# Patient Record
Sex: MALE | Race: BLACK OR AFRICAN AMERICAN | NOT HISPANIC OR LATINO | Employment: FULL TIME | URBAN - METROPOLITAN AREA
[De-identification: names, ages, dates, MRNs, and addresses within clinical notes are randomized per-mention and may not be internally consistent; named-entity substitution may affect disease eponyms.]

---

## 2024-04-17 ENCOUNTER — HOSPITAL ENCOUNTER (EMERGENCY)
Facility: MEDICAL CENTER | Age: 29
End: 2024-04-17
Attending: EMERGENCY MEDICINE

## 2024-04-17 VITALS
HEART RATE: 74 BPM | WEIGHT: 147.71 LBS | HEIGHT: 73 IN | RESPIRATION RATE: 16 BRPM | DIASTOLIC BLOOD PRESSURE: 71 MMHG | SYSTOLIC BLOOD PRESSURE: 124 MMHG | OXYGEN SATURATION: 100 % | BODY MASS INDEX: 19.58 KG/M2 | TEMPERATURE: 98.1 F

## 2024-04-17 DIAGNOSIS — K08.89 DENTALGIA: ICD-10-CM

## 2024-04-17 PROCEDURE — A9270 NON-COVERED ITEM OR SERVICE: HCPCS | Performed by: EMERGENCY MEDICINE

## 2024-04-17 PROCEDURE — 99283 EMERGENCY DEPT VISIT LOW MDM: CPT

## 2024-04-17 PROCEDURE — 700102 HCHG RX REV CODE 250 W/ 637 OVERRIDE(OP): Performed by: EMERGENCY MEDICINE

## 2024-04-17 RX ORDER — IBUPROFEN 600 MG/1
600 TABLET ORAL EVERY 6 HOURS PRN
Qty: 30 TABLET | Refills: 0 | Status: SHIPPED | OUTPATIENT
Start: 2024-04-17 | End: 2024-04-17

## 2024-04-17 RX ORDER — IBUPROFEN 600 MG/1
600 TABLET ORAL EVERY 6 HOURS PRN
Qty: 30 TABLET | Refills: 0 | Status: SHIPPED | OUTPATIENT
Start: 2024-04-17

## 2024-04-17 RX ORDER — CLINDAMYCIN HYDROCHLORIDE 300 MG/1
300 CAPSULE ORAL 3 TIMES DAILY
Qty: 30 CAPSULE | Refills: 0 | Status: ACTIVE | OUTPATIENT
Start: 2024-04-17 | End: 2024-04-27

## 2024-04-17 RX ORDER — CLINDAMYCIN HYDROCHLORIDE 300 MG/1
300 CAPSULE ORAL 3 TIMES DAILY
Qty: 30 CAPSULE | Refills: 0 | Status: SHIPPED | OUTPATIENT
Start: 2024-04-17 | End: 2024-04-17

## 2024-04-17 RX ORDER — IBUPROFEN 600 MG/1
600 TABLET ORAL ONCE
Status: COMPLETED | OUTPATIENT
Start: 2024-04-17 | End: 2024-04-17

## 2024-04-17 RX ORDER — OXYCODONE HYDROCHLORIDE AND ACETAMINOPHEN 5; 325 MG/1; MG/1
1 TABLET ORAL ONCE
Status: COMPLETED | OUTPATIENT
Start: 2024-04-17 | End: 2024-04-17

## 2024-04-17 RX ADMIN — OXYCODONE AND ACETAMINOPHEN 1 TABLET: 5; 325 TABLET ORAL at 12:57

## 2024-04-17 RX ADMIN — IBUPROFEN 600 MG: 600 TABLET, FILM COATED ORAL at 12:57

## 2024-04-17 ASSESSMENT — LIFESTYLE VARIABLES
TOTAL SCORE: 0
DO YOU DRINK ALCOHOL: NO
HAVE PEOPLE ANNOYED YOU BY CRITICIZING YOUR DRINKING: NO
EVER FELT BAD OR GUILTY ABOUT YOUR DRINKING: NO
EVER HAD A DRINK FIRST THING IN THE MORNING TO STEADY YOUR NERVES TO GET RID OF A HANGOVER: NO
CONSUMPTION TOTAL: INCOMPLETE
HAVE YOU EVER FELT YOU SHOULD CUT DOWN ON YOUR DRINKING: NO

## 2024-04-17 ASSESSMENT — COGNITIVE AND FUNCTIONAL STATUS - GENERAL
SUGGESTED CMS G CODE MODIFIER DAILY ACTIVITY: CH
DAILY ACTIVITIY SCORE: 24
MOBILITY SCORE: 24
SUGGESTED CMS G CODE MODIFIER MOBILITY: CH

## 2024-04-17 ASSESSMENT — PAIN DESCRIPTION - PAIN TYPE: TYPE: ACUTE PAIN

## 2024-04-17 NOTE — ED NOTES
Discharge teaching and paperwork provided regarding dental pain and all questions/concerns answered. VSS,  assessment stable. Given information regarding home care and reasons to follow up with ED or primary MD. Patient provided ibuprofen Rx. Patient discharged to the care of spouse and ambulated out of the ED.

## 2024-04-17 NOTE — ED PROVIDER NOTES
"ED Provider Note    CHIEF COMPLAINT  Chief Complaint   Patient presents with    Dental Pain     Patient reports R upper dental pain and swelling for the last 5 days. Patient reports he had a crack in that tooth and believes it is now infected     HPI/ROS    Debi Cortez is a 28 y.o. male who presents with dental pain.  The patient has pain to the right upper molar.  He is aware of decay and a fracture in this region but feels there is an acute infection as he has been having increasing pain in the last 5 days.  Does not have any facial swelling.  He does not have any associated fevers.  He has not had any nausea or vomiting.    PAST MEDICAL HISTORY       SURGICAL HISTORY  patient denies any surgical history    FAMILY HISTORY  History reviewed. No pertinent family history.    SOCIAL HISTORY  Social History     Tobacco Use    Smoking status: Never    Smokeless tobacco: Never   Substance and Sexual Activity    Alcohol use: Never    Drug use: Yes    Sexual activity: Not on file       CURRENT MEDICATIONS  Home Medications       Reviewed by Juli Douglas R.N. (Registered Nurse) on 04/17/24 at 1211  Med List Status: Partial     Medication Last Dose Status        Patient Freddie Taking any Medications                           ALLERGIES  No Known Allergies    PHYSICAL EXAM  VITAL SIGNS: /69   Pulse 67   Temp 36.6 °C (97.9 °F) (Oral)   Resp 16   Ht 1.854 m (6' 1\")   Wt 67 kg (147 lb 11.3 oz)   SpO2 97%   BMI 19.49 kg/m²    In general the patient appears uncomfortable but nontoxic    Facial examination the patient does not have any facial swelling    Oral cavity the patient does have poor dentition with significant decay and fracture of the right upper molar with pain with percussion.  There is no gingival fluctuance    Neck is supple without adenopathy      COURSE & MEDICAL DECISION MAKING    This is a 28-year-old male who presents to the emergency department with dentalgia.  I suspect he could be developing " infection based on the acute onset of his pain.  He will require resection of the molar that is fractured and decayed.  The patient be placed on clindamycin for antibiotic coverage.  He will receive a one-time dose of Percocet as well as Motrin prior to discharge.  I will prescribe Motrin from an anti-inflammatory standpoint.  The patient will return to the emergency department for facial swelling.  Otherwise I give the patient referrals for outpatient management from a dental standpoint.    FINAL DIAGNOSIS  Dentalgia    Disposition  The patient will be discharged in stable condition       Electronically signed by: Jhony Mckeon M.D., 4/17/2024 12:43 PM

## 2024-04-17 NOTE — ED TRIAGE NOTES
"Chief Complaint   Patient presents with    Dental Pain     Patient reports R upper dental pain and swelling for the last 5 days. Patient reports he had a crack in that tooth and believes it is now infected       29 yo male to triage for above complaint. Patient is not established with dentist    Pt is alert and oriented, speaking in full sentences, follows commands and responds appropriately to questions.     Patient placed back in lobby and educated on triage process. Asked to inform RN of any changes.    /69   Pulse 67   Temp 36.6 °C (97.9 °F) (Oral)   Resp 16   Ht 1.854 m (6' 1\")   Wt 67 kg (147 lb 11.3 oz)   SpO2 97%   BMI 19.49 kg/m²     "

## 2024-06-04 ENCOUNTER — HOSPITAL ENCOUNTER (EMERGENCY)
Facility: MEDICAL CENTER | Age: 29
End: 2024-06-04
Attending: EMERGENCY MEDICINE

## 2024-06-04 VITALS
DIASTOLIC BLOOD PRESSURE: 64 MMHG | WEIGHT: 154.32 LBS | RESPIRATION RATE: 15 BRPM | BODY MASS INDEX: 20.36 KG/M2 | OXYGEN SATURATION: 94 % | SYSTOLIC BLOOD PRESSURE: 110 MMHG | TEMPERATURE: 97.3 F | HEART RATE: 72 BPM

## 2024-06-04 DIAGNOSIS — R19.7 DIARRHEA OF PRESUMED INFECTIOUS ORIGIN: ICD-10-CM

## 2024-06-04 DIAGNOSIS — R11.2 NAUSEA AND VOMITING, UNSPECIFIED VOMITING TYPE: ICD-10-CM

## 2024-06-04 LAB
ALBUMIN SERPL BCP-MCNC: 4.4 G/DL (ref 3.2–4.9)
ALBUMIN/GLOB SERPL: 1.6 G/DL
ALP SERPL-CCNC: 65 U/L (ref 30–99)
ALT SERPL-CCNC: 14 U/L (ref 2–50)
ANION GAP SERPL CALC-SCNC: 12 MMOL/L (ref 7–16)
APPEARANCE UR: CLEAR
AST SERPL-CCNC: 19 U/L (ref 12–45)
BASOPHILS # BLD AUTO: 0.3 % (ref 0–1.8)
BASOPHILS # BLD: 0.02 K/UL (ref 0–0.12)
BILIRUB SERPL-MCNC: 0.4 MG/DL (ref 0.1–1.5)
BILIRUB UR QL STRIP.AUTO: NEGATIVE
BUN SERPL-MCNC: 12 MG/DL (ref 8–22)
CALCIUM ALBUM COR SERPL-MCNC: 9 MG/DL (ref 8.5–10.5)
CALCIUM SERPL-MCNC: 9.3 MG/DL (ref 8.5–10.5)
CHLORIDE SERPL-SCNC: 104 MMOL/L (ref 96–112)
CO2 SERPL-SCNC: 23 MMOL/L (ref 20–33)
COLOR UR: YELLOW
CREAT SERPL-MCNC: 0.85 MG/DL (ref 0.5–1.4)
EOSINOPHIL # BLD AUTO: 0.12 K/UL (ref 0–0.51)
EOSINOPHIL NFR BLD: 1.5 % (ref 0–6.9)
ERYTHROCYTE [DISTWIDTH] IN BLOOD BY AUTOMATED COUNT: 33.2 FL (ref 35.9–50)
GFR SERPLBLD CREATININE-BSD FMLA CKD-EPI: 119 ML/MIN/1.73 M 2
GLOBULIN SER CALC-MCNC: 2.8 G/DL (ref 1.9–3.5)
GLUCOSE SERPL-MCNC: 87 MG/DL (ref 65–99)
GLUCOSE UR STRIP.AUTO-MCNC: NEGATIVE MG/DL
HCT VFR BLD AUTO: 42.6 % (ref 42–52)
HGB BLD-MCNC: 14 G/DL (ref 14–18)
IMM GRANULOCYTES # BLD AUTO: 0.02 K/UL (ref 0–0.11)
IMM GRANULOCYTES NFR BLD AUTO: 0.3 % (ref 0–0.9)
KETONES UR STRIP.AUTO-MCNC: NEGATIVE MG/DL
LEUKOCYTE ESTERASE UR QL STRIP.AUTO: NEGATIVE
LIPASE SERPL-CCNC: 24 U/L (ref 11–82)
LYMPHOCYTES # BLD AUTO: 1.63 K/UL (ref 1–4.8)
LYMPHOCYTES NFR BLD: 20.5 % (ref 22–41)
MCH RBC QN AUTO: 23.4 PG (ref 27–33)
MCHC RBC AUTO-ENTMCNC: 32.9 G/DL (ref 32.3–36.5)
MCV RBC AUTO: 71.2 FL (ref 81.4–97.8)
MICRO URNS: NORMAL
MONOCYTES # BLD AUTO: 0.58 K/UL (ref 0–0.85)
MONOCYTES NFR BLD AUTO: 7.3 % (ref 0–13.4)
NEUTROPHILS # BLD AUTO: 5.6 K/UL (ref 1.82–7.42)
NEUTROPHILS NFR BLD: 70.1 % (ref 44–72)
NITRITE UR QL STRIP.AUTO: NEGATIVE
NRBC # BLD AUTO: 0 K/UL
NRBC BLD-RTO: 0 /100 WBC (ref 0–0.2)
PH UR STRIP.AUTO: 6 [PH] (ref 5–8)
PLATELET # BLD AUTO: 225 K/UL (ref 164–446)
PMV BLD AUTO: 11.2 FL (ref 9–12.9)
POTASSIUM SERPL-SCNC: 3.8 MMOL/L (ref 3.6–5.5)
PROT SERPL-MCNC: 7.2 G/DL (ref 6–8.2)
PROT UR QL STRIP: NEGATIVE MG/DL
RBC # BLD AUTO: 5.98 M/UL (ref 4.7–6.1)
RBC UR QL AUTO: NEGATIVE
SODIUM SERPL-SCNC: 139 MMOL/L (ref 135–145)
SP GR UR STRIP.AUTO: 1.02
UROBILINOGEN UR STRIP.AUTO-MCNC: 1 MG/DL
WBC # BLD AUTO: 8 K/UL (ref 4.8–10.8)

## 2024-06-04 PROCEDURE — 700111 HCHG RX REV CODE 636 W/ 250 OVERRIDE (IP): Performed by: EMERGENCY MEDICINE

## 2024-06-04 PROCEDURE — 36415 COLL VENOUS BLD VENIPUNCTURE: CPT

## 2024-06-04 PROCEDURE — A9270 NON-COVERED ITEM OR SERVICE: HCPCS | Performed by: EMERGENCY MEDICINE

## 2024-06-04 PROCEDURE — 85025 COMPLETE CBC W/AUTO DIFF WBC: CPT

## 2024-06-04 PROCEDURE — 700102 HCHG RX REV CODE 250 W/ 637 OVERRIDE(OP): Performed by: EMERGENCY MEDICINE

## 2024-06-04 PROCEDURE — 80053 COMPREHEN METABOLIC PANEL: CPT

## 2024-06-04 PROCEDURE — 81003 URINALYSIS AUTO W/O SCOPE: CPT

## 2024-06-04 PROCEDURE — 83690 ASSAY OF LIPASE: CPT

## 2024-06-04 PROCEDURE — 99284 EMERGENCY DEPT VISIT MOD MDM: CPT

## 2024-06-04 RX ORDER — DICYCLOMINE HCL 20 MG
20 TABLET ORAL EVERY 6 HOURS
Qty: 40 TABLET | Refills: 0 | Status: SHIPPED | OUTPATIENT
Start: 2024-06-04 | End: 2024-06-14

## 2024-06-04 RX ORDER — ONDANSETRON 4 MG/1
4 TABLET, ORALLY DISINTEGRATING ORAL ONCE
Status: COMPLETED | OUTPATIENT
Start: 2024-06-04 | End: 2024-06-04

## 2024-06-04 RX ORDER — ONDANSETRON 4 MG/1
4 TABLET, ORALLY DISINTEGRATING ORAL EVERY 6 HOURS PRN
Qty: 10 TABLET | Refills: 0 | Status: SHIPPED | OUTPATIENT
Start: 2024-06-04

## 2024-06-04 RX ORDER — DICYCLOMINE HCL 20 MG
20 TABLET ORAL ONCE
Status: COMPLETED | OUTPATIENT
Start: 2024-06-04 | End: 2024-06-04

## 2024-06-04 RX ADMIN — DICYCLOMINE HYDROCHLORIDE 20 MG: 20 TABLET ORAL at 16:16

## 2024-06-04 RX ADMIN — ONDANSETRON 4 MG: 4 TABLET, ORALLY DISINTEGRATING ORAL at 15:48

## 2024-06-04 NOTE — ED PROVIDER NOTES
ED Provider Note    CHIEF COMPLAINT  Chief Complaint   Patient presents with    N/V    Diarrhea       EXTERNAL RECORDS REVIEWED  Her ER visit from 4/17/2024 for dental pain.    HPI/ROS  LIMITATION TO HISTORY   Select: : None  OUTSIDE HISTORIAN(S):  none    Debi Cortez is a 28 y.o. male who presents the emergency room for concerns regarding some diarrhea that started yesterday evening that after several hours progressed to multiple episodes of nonbilious nonbloody emesis.  He had some abdominal cramping with this, has not any bloody bowel movements and says that he is unsure of any sick exposures in the household but says he does have some possible sick exposures at work and is unsure of any foodborne illness that he may have acquired.  He has not had any fevers, no prior history of abdominal surgeries, he says he is feeling improved at the moment but says some of these episodes have been episodic in nature.    PAST MEDICAL HISTORY   Cleft lip repair    SURGICAL HISTORY  patient denies any surgical history    FAMILY HISTORY  No family history on file.    SOCIAL HISTORY  Social History     Tobacco Use    Smoking status: Never    Smokeless tobacco: Never   Substance and Sexual Activity    Alcohol use: Never    Drug use: Yes    Sexual activity: Not on file       CURRENT MEDICATIONS  Home Medications       Reviewed by Amie Tan R.N. (Registered Nurse) on 06/04/24 at 1417  Med List Status: Partial     Medication Last Dose Status   ibuprofen (MOTRIN) 600 MG Tab  Active                    ALLERGIES  No Known Allergies    PHYSICAL EXAM  VITAL SIGNS: BP 91/58   Pulse 70   Temp 36.2 °C (97.1 °F) (Temporal)   Resp 16   Wt 70 kg (154 lb 5.2 oz)   SpO2 93%   BMI 20.36 kg/m²    Genl: M sitting in chair comfortably, speaking clearly, appears in no acute distress   Head: NC/AT   ENT: Mucous membranes moist, posterior pharynx clear, uvula midline, nares patent bilaterally   Eyes: Normal sclera, pupils equal round  reactive to light  Pulmonary: Lungs are clear to auscultation bilaterally  Chest: No TTP  CV:  RRR, no murmur appreciated, pulses 2+ in both upper and lower extremities,  Abdomen: soft, NT/ND; no rebound/guarding, no masses palpated  Musculoskeletal: Pain free ROM of the neck. Moving upper and lower extremities in spontaneous and coordinated fashion  Neuro: A&Ox4 (person, place, time, situation), speech fluent, gait steady, no focal deficits appreciated, No cerebellar signs. Sensation is grossly intact in the distal upper and lower extremities.  5/5 strength in  and dorsiflexion/plantar flexion of the ankles  Psych: Patient has an appropriate affect and behavior  Skin: No rash or lesions.  No pallor or jaundice.  No cyanosis.  Warm and dry.     EKG/LABS  Labs Reviewed   CBC WITH DIFFERENTIAL - Abnormal; Notable for the following components:       Result Value    MCV 71.2 (*)     MCH 23.4 (*)     RDW 33.2 (*)     Lymphocytes 20.50 (*)     All other components within normal limits   COMP METABOLIC PANEL   LIPASE   URINALYSIS   ESTIMATED GFR     RADIOLOGY/PROCEDURES   none    COURSE & MEDICAL DECISION MAKING    ASSESSMENT, COURSE AND PLAN  Care Narrative: Presents emergency room for symptoms as described above.  The patient is alert, oriented, has been having acute onset of some diarrhea without passage of blood, some nauseousness and vomiting with no localizing abdominal pain, no abdominal distention.  This is most likely reflective of foodborne illness versus bacterial toxin ingestion or viral gastroenteritis.  Overall the patient remains nontoxic and lab work had been obtained in triage.  He has no leukocytosis, no concerning anemia, no gross electrolyte derangements or FARZANA and no LFT changes or evidence of pancreatitis.  He tolerated oral medications well, was able to tolerate p.o. intake at this time I do believe that symptomatic control would be recommended and no further advanced medical imaging or IV fluid  resuscitation is currently indicated.  Patient is amenable to this, will follow-up with her outpatient physician at the [facility].  Discharged home in stable condition.    DISPOSITION AND DISCUSSIONS  I have discussed management of the patient with the following physicians and OTTO's:  none    Discussion of management with other \[facility]\"" or appropriate source(s): None     Escalation of care considered, and ultimately not performed:IV fluids and diagnostic imaging    Barriers to care at this time, including but not limited to: none.     Decision tools and prescription drugs considered including, but not limited to:  Zofran and Bentyl .    FINAL DIAGNOSIS  1. Nausea and vomiting, unspecified vomiting type    2. Diarrhea of presumed infectious origin      Electronically signed by: Austyn Hull M.D., 6/4/2024 3:37 PM

## 2024-06-04 NOTE — ED NOTES
Pt discharged to lobby with steady gait. GCS 15. Pt in possession of belongings. Pt provided discharge education and information pertaining to medications and follow up appointments. Pt received copy of discharge instructions and verbalized understanding.     Vitals:    06/04/24 1617   BP: 110/64   Pulse: 72   Resp: 15   Temp: 36.3 °C (97.3 °F)   SpO2: 94%

## 2024-12-26 ENCOUNTER — HOSPITAL ENCOUNTER (EMERGENCY)
Facility: MEDICAL CENTER | Age: 29
End: 2024-12-26
Attending: EMERGENCY MEDICINE
Payer: COMMERCIAL

## 2024-12-26 ENCOUNTER — APPOINTMENT (OUTPATIENT)
Dept: RADIOLOGY | Facility: MEDICAL CENTER | Age: 29
End: 2024-12-26
Attending: EMERGENCY MEDICINE
Payer: COMMERCIAL

## 2024-12-26 ENCOUNTER — PHARMACY VISIT (OUTPATIENT)
Dept: PHARMACY | Facility: MEDICAL CENTER | Age: 29
End: 2024-12-26
Payer: MEDICARE

## 2024-12-26 VITALS
RESPIRATION RATE: 16 BRPM | OXYGEN SATURATION: 100 % | TEMPERATURE: 97 F | HEIGHT: 73 IN | DIASTOLIC BLOOD PRESSURE: 75 MMHG | SYSTOLIC BLOOD PRESSURE: 122 MMHG | HEART RATE: 60 BPM | WEIGHT: 155 LBS | BODY MASS INDEX: 20.54 KG/M2

## 2024-12-26 DIAGNOSIS — R07.9 CHEST PAIN, UNSPECIFIED TYPE: ICD-10-CM

## 2024-12-26 DIAGNOSIS — K29.00 ACUTE GASTRITIS WITHOUT HEMORRHAGE, UNSPECIFIED GASTRITIS TYPE: ICD-10-CM

## 2024-12-26 LAB
ALBUMIN SERPL BCP-MCNC: 4.5 G/DL (ref 3.2–4.9)
ALBUMIN/GLOB SERPL: 1.5 G/DL
ALP SERPL-CCNC: 76 U/L (ref 30–99)
ALT SERPL-CCNC: 30 U/L (ref 2–50)
ANION GAP SERPL CALC-SCNC: 12 MMOL/L (ref 7–16)
AST SERPL-CCNC: 36 U/L (ref 12–45)
BASOPHILS # BLD AUTO: 0.9 % (ref 0–1.8)
BASOPHILS # BLD: 0.05 K/UL (ref 0–0.12)
BILIRUB SERPL-MCNC: 0.5 MG/DL (ref 0.1–1.5)
BUN SERPL-MCNC: 15 MG/DL (ref 8–22)
CALCIUM ALBUM COR SERPL-MCNC: 9.1 MG/DL (ref 8.5–10.5)
CALCIUM SERPL-MCNC: 9.5 MG/DL (ref 8.5–10.5)
CHLORIDE SERPL-SCNC: 107 MMOL/L (ref 96–112)
CO2 SERPL-SCNC: 21 MMOL/L (ref 20–33)
CREAT SERPL-MCNC: 0.92 MG/DL (ref 0.5–1.4)
EKG IMPRESSION: NORMAL
EOSINOPHIL # BLD AUTO: 0.28 K/UL (ref 0–0.51)
EOSINOPHIL NFR BLD: 4.9 % (ref 0–6.9)
ERYTHROCYTE [DISTWIDTH] IN BLOOD BY AUTOMATED COUNT: 35.3 FL (ref 35.9–50)
GFR SERPLBLD CREATININE-BSD FMLA CKD-EPI: 114 ML/MIN/1.73 M 2
GLOBULIN SER CALC-MCNC: 3.1 G/DL (ref 1.9–3.5)
GLUCOSE SERPL-MCNC: 78 MG/DL (ref 65–99)
HCT VFR BLD AUTO: 47.1 % (ref 42–52)
HGB BLD-MCNC: 15.2 G/DL (ref 14–18)
IMM GRANULOCYTES # BLD AUTO: 0.01 K/UL (ref 0–0.11)
IMM GRANULOCYTES NFR BLD AUTO: 0.2 % (ref 0–0.9)
LYMPHOCYTES # BLD AUTO: 2.14 K/UL (ref 1–4.8)
LYMPHOCYTES NFR BLD: 37.3 % (ref 22–41)
MCH RBC QN AUTO: 23.6 PG (ref 27–33)
MCHC RBC AUTO-ENTMCNC: 32.3 G/DL (ref 32.3–36.5)
MCV RBC AUTO: 73.3 FL (ref 81.4–97.8)
MONOCYTES # BLD AUTO: 0.54 K/UL (ref 0–0.85)
MONOCYTES NFR BLD AUTO: 9.4 % (ref 0–13.4)
NEUTROPHILS # BLD AUTO: 2.71 K/UL (ref 1.82–7.42)
NEUTROPHILS NFR BLD: 47.3 % (ref 44–72)
NRBC # BLD AUTO: 0 K/UL
NRBC BLD-RTO: 0 /100 WBC (ref 0–0.2)
PLATELET # BLD AUTO: 190 K/UL (ref 164–446)
PMV BLD AUTO: 11.1 FL (ref 9–12.9)
POTASSIUM SERPL-SCNC: 4.1 MMOL/L (ref 3.6–5.5)
PROT SERPL-MCNC: 7.6 G/DL (ref 6–8.2)
RBC # BLD AUTO: 6.43 M/UL (ref 4.7–6.1)
SODIUM SERPL-SCNC: 140 MMOL/L (ref 135–145)
TROPONIN T SERPL-MCNC: 6 NG/L (ref 6–19)
TROPONIN T SERPL-MCNC: <6 NG/L (ref 6–19)
WBC # BLD AUTO: 5.7 K/UL (ref 4.8–10.8)

## 2024-12-26 PROCEDURE — 80053 COMPREHEN METABOLIC PANEL: CPT

## 2024-12-26 PROCEDURE — 99284 EMERGENCY DEPT VISIT MOD MDM: CPT

## 2024-12-26 PROCEDURE — 36415 COLL VENOUS BLD VENIPUNCTURE: CPT

## 2024-12-26 PROCEDURE — 700102 HCHG RX REV CODE 250 W/ 637 OVERRIDE(OP): Performed by: EMERGENCY MEDICINE

## 2024-12-26 PROCEDURE — 71045 X-RAY EXAM CHEST 1 VIEW: CPT

## 2024-12-26 PROCEDURE — 93005 ELECTROCARDIOGRAM TRACING: CPT | Mod: TC | Performed by: EMERGENCY MEDICINE

## 2024-12-26 PROCEDURE — 93005 ELECTROCARDIOGRAM TRACING: CPT | Mod: TC

## 2024-12-26 PROCEDURE — 84484 ASSAY OF TROPONIN QUANT: CPT

## 2024-12-26 PROCEDURE — A9270 NON-COVERED ITEM OR SERVICE: HCPCS | Performed by: EMERGENCY MEDICINE

## 2024-12-26 PROCEDURE — RXMED WILLOW AMBULATORY MEDICATION CHARGE: Performed by: EMERGENCY MEDICINE

## 2024-12-26 PROCEDURE — 85025 COMPLETE CBC W/AUTO DIFF WBC: CPT

## 2024-12-26 RX ORDER — FAMOTIDINE 20 MG/1
20 TABLET, FILM COATED ORAL 2 TIMES DAILY
Qty: 28 TABLET | Refills: 0 | Status: SHIPPED | OUTPATIENT
Start: 2024-12-26 | End: 2025-01-09

## 2024-12-26 RX ADMIN — LIDOCAINE HYDROCHLORIDE 30 ML: 20 SOLUTION ORAL; TOPICAL at 08:10

## 2024-12-26 ASSESSMENT — FIBROSIS 4 INDEX: FIB4 SCORE: 0.7

## 2024-12-26 ASSESSMENT — PAIN DESCRIPTION - DESCRIPTORS: DESCRIPTORS: SHOOTING;CRAMPING

## 2024-12-26 ASSESSMENT — PAIN DESCRIPTION - PAIN TYPE: TYPE: ACUTE PAIN

## 2024-12-26 NOTE — DISCHARGE INSTRUCTIONS
Your workup today was very reassuring.  Your symptoms are most consistent with likely inflammation of your stomach.  Your heart workup was very normal.  EKG was reassuring, your heart enzyme was normal, the rest your labs are all very reassuring.  Take the medication as directed.  If your symptoms worsen significantly return to the Emergency Department

## 2024-12-26 NOTE — ED PROVIDER NOTES
ED Provider Note    CHIEF COMPLAINT  Chief Complaint   Patient presents with    Chest Pain     C/o mid sternal chest pain since 12 mn. Described as shock and cramping pain. Constant in nature. Denies SOB. + Dizziness         HPI/ROS      Debi Cortez is a 29 y.o. male who presents with chief complaint of chest pain for the last 7 hours.  Chest pain began around midnight.  Patient reports pain is in his lower chest, upper abdomen.  He drank some alcohol and ate a relatively heavy dinner last night for [place].  Pain has no association with exertion. Reports they can walk without any difficulty or symptom provocation   Pain with no associated SOB  Denies associated nausea or diaphoresis  denies decreased exertional capacity  denies associated lower extremity edema, orthopnea or PND  denies tearing quality of pain, no radiation to back  denies hemoptysis  denies surgery, fracture or casted extremity within the last mo  denies use of estrogen containing OCP  denies hx of VTE. denies active or recently active ca      PAST MEDICAL HISTORY       SURGICAL HISTORY  patient denies any surgical history    FAMILY HISTORY  History reviewed. No pertinent family history.    SOCIAL HISTORY  Social History     Tobacco Use    Smoking status: Never    Smokeless tobacco: Never   Vaping Use    Vaping status: Some Days   Substance and Sexual Activity    Alcohol use: Yes     Comment: occ    Drug use: Not Currently     Comment: marijuana    Sexual activity: Not on file       CURRENT MEDICATIONS  Home Medications       Reviewed by Rolando Jin R.N. (Registered Nurse) on 12/26/24 at 0543  Med List Status: Partial     Medication Last Dose Status   ibuprofen (MOTRIN) 600 MG Tab  Active   ondansetron (ZOFRAN ODT) 4 MG TABLET DISPERSIBLE  Active                  Audit from Redirected Encounters    **Home medications have not yet been reviewed for this encounter**         ALLERGIES  No Known Allergies    PHYSICAL EXAM  VITAL SIGNS: /75  "  Pulse (!) 57   Temp 35.9 °C (96.7 °F) (Temporal)   Resp 18   Ht 1.854 m (6' 1\")   Wt 70.3 kg (155 lb)   SpO2 100%   BMI 20.45 kg/m²    Physical Exam  Constitutional:       Appearance: Normal appearance.   HENT:      Head: Normocephalic.      Right Ear: Tympanic membrane normal.      Left Ear: Tympanic membrane normal.      Nose: Nose normal.      Mouth/Throat:      Mouth: Mucous membranes are moist.   Eyes:      Extraocular Movements: Extraocular movements intact.      Pupils: Pupils are equal, round, and reactive to light.   Cardiovascular:      Rate and Rhythm: Normal rate and regular rhythm.   Pulmonary:      Effort: Pulmonary effort is normal. No respiratory distress.      Breath sounds: Normal breath sounds. No stridor. No wheezing or rales.   Chest:      Chest wall: No tenderness.   Abdominal:      General: Abdomen is flat. There is no distension.      Palpations: Abdomen is soft. There is no mass.      Tenderness: There is abdominal tenderness.      Comments: Minimal epigastric tenderness without guarding or rebound.  Negative Juan's   Musculoskeletal:      Cervical back: Normal range of motion.   Skin:     General: Skin is warm.      Capillary Refill: Capillary refill takes less than 2 seconds.   Neurological:      General: No focal deficit present.      Mental Status: He is alert and oriented to person, place, and time.   Psychiatric:         Mood and Affect: Mood normal.           EKG/LABS  Results for orders placed or performed during the hospital encounter of 12/26/24   CBC with Differential    Collection Time: 12/26/24  6:08 AM   Result Value Ref Range    WBC 5.7 4.8 - 10.8 K/uL    RBC 6.43 (H) 4.70 - 6.10 M/uL    Hemoglobin 15.2 14.0 - 18.0 g/dL    Hematocrit 47.1 42.0 - 52.0 %    MCV 73.3 (L) 81.4 - 97.8 fL    MCH 23.6 (L) 27.0 - 33.0 pg    MCHC 32.3 32.3 - 36.5 g/dL    RDW 35.3 (L) 35.9 - 50.0 fL    Platelet Count 190 164 - 446 K/uL    MPV 11.1 9.0 - 12.9 fL    Neutrophils-Polys 47.30 " 44.00 - 72.00 %    Lymphocytes 37.30 22.00 - 41.00 %    Monocytes 9.40 0.00 - 13.40 %    Eosinophils 4.90 0.00 - 6.90 %    Basophils 0.90 0.00 - 1.80 %    Immature Granulocytes 0.20 0.00 - 0.90 %    Nucleated RBC 0.00 0.00 - 0.20 /100 WBC    Neutrophils (Absolute) 2.71 1.82 - 7.42 K/uL    Lymphs (Absolute) 2.14 1.00 - 4.80 K/uL    Monos (Absolute) 0.54 0.00 - 0.85 K/uL    Eos (Absolute) 0.28 0.00 - 0.51 K/uL    Baso (Absolute) 0.05 0.00 - 0.12 K/uL    Immature Granulocytes (abs) 0.01 0.00 - 0.11 K/uL    NRBC (Absolute) 0.00 K/uL   Complete Metabolic Panel (CMP)    Collection Time: 24  6:08 AM   Result Value Ref Range    Sodium 140 135 - 145 mmol/L    Potassium 4.1 3.6 - 5.5 mmol/L    Chloride 107 96 - 112 mmol/L    Co2 21 20 - 33 mmol/L    Anion Gap 12.0 7.0 - 16.0    Glucose 78 65 - 99 mg/dL    Bun 15 8 - 22 mg/dL    Creatinine 0.92 0.50 - 1.40 mg/dL    Calcium 9.5 8.5 - 10.5 mg/dL    Correct Calcium 9.1 8.5 - 10.5 mg/dL    AST(SGOT) 36 12 - 45 U/L    ALT(SGPT) 30 2 - 50 U/L    Alkaline Phosphatase 76 30 - 99 U/L    Total Bilirubin 0.5 0.1 - 1.5 mg/dL    Albumin 4.5 3.2 - 4.9 g/dL    Total Protein 7.6 6.0 - 8.2 g/dL    Globulin 3.1 1.9 - 3.5 g/dL    A-G Ratio 1.5 g/dL   Troponin STAT    Collection Time: 24  6:08 AM   Result Value Ref Range    Troponin T <6 6 - 19 ng/L   ESTIMATED GFR    Collection Time: 24  6:08 AM   Result Value Ref Range    GFR (CKD-EPI) 114 >60 mL/min/1.73 m 2   EKG (NOW)    Collection Time: 24  7:43 AM   Result Value Ref Range    Report       [facility] Emergency Dept.    Test Date:  2024  Pt Name:    DIANE LIVE                Department: ER  MRN:        [id number]                      Room:  Gender:     Male                         Technician: [id number]  :        1995                   Requested By:ER TRIAGE PROTOCOL  Order #:    [id number]                    Ramin MD: Dulce Neves MD    Measurements  Intervals                                 Axis  Rate:       61                           P:          66  VA:         174                          QRS:        59  QRSD:       96                           T:          53  QT:         399  QTc:        402    Interpretive Statements  EKG is normal sinus rhythm, normal axis normal intervals no ST changes  consistent with acute regional ischemia  Electronically Signed On 12- 07:43:33 PST by Dulce Neves MD     Troponin in two (2) hours    Collection Time: 12/26/24  7:49 AM   Result Value Ref Range    Troponin T 6 6 - 19 ng/L       I have independently interpreted this EKG    RADIOLOGY/PROCEDURES   I have independently interpreted the diagnostic imaging associated with this visit and am waiting the final reading from the radiologist.   My preliminary interpretation is as follows: Unremarkable chest x-ray    Radiologist interpretation:  DX-CHEST-PORTABLE (1 VIEW)   Final Result      1.  No acute cardiac or pulmonary abnormalities are identified.            COURSE & MEDICAL DECISION MAKING    ASSESSMENT, COURSE AND PLAN  Care Narrative: Very well-appearing patient here with symptoms most consistent likely gastritis.  He has some mild reproducible tenderness on exam.  His EKG is very reassuring.  Triage labs were sent.  A troponin was sent as well, patient symptoms do not appear cardiopulmonary in etiology but given his chest pain I do believe this is reasonable to workup.  Patient's troponin x 2 are both negative.  Patient's heart score is 1.            DISPOSITION AND DISCUSSIONS      Escalation of care considered, and ultimately not performed:acute inpatient care management, however at this time, the patient is most appropriate for outpatient management as patient with a heart score of 0, he has been instructed to establish and follow-up with a primary care provider.  Return precautions reviewed.    Barriers to care at this time, including but not limited to: Patient does not have established PCP.      Decision tools and prescription drugs considered including, but not limited to: Heart score of 1    FINAL DIAGNOSIS  1. Chest pain, unspecified type        2. Acute gastritis without hemorrhage, unspecified gastritis type  famotidine (PEPCID) 20 MG Tab

## 2024-12-26 NOTE — ED NOTES
Pt ambulated to rm 36 from triage.  Agree with triage note.  Pt states pain comes and goes and points to epigatrsc area as location of pain.  Pt up for ERP eval